# Patient Record
Sex: MALE | Race: WHITE | URBAN - NONMETROPOLITAN AREA
[De-identification: names, ages, dates, MRNs, and addresses within clinical notes are randomized per-mention and may not be internally consistent; named-entity substitution may affect disease eponyms.]

---

## 2019-04-29 ENCOUNTER — IMPORTED ENCOUNTER (OUTPATIENT)
Dept: URBAN - NONMETROPOLITAN AREA CLINIC 1 | Facility: CLINIC | Age: 64
End: 2019-04-29

## 2019-04-29 PROBLEM — H16.002: Noted: 2019-04-29

## 2019-04-29 PROCEDURE — 92002 INTRM OPH EXAM NEW PATIENT: CPT

## 2019-05-01 ENCOUNTER — IMPORTED ENCOUNTER (OUTPATIENT)
Dept: URBAN - NONMETROPOLITAN AREA CLINIC 1 | Facility: CLINIC | Age: 64
End: 2019-05-01

## 2019-05-01 PROCEDURE — 99213 OFFICE O/P EST LOW 20 MIN: CPT

## 2019-05-01 NOTE — PATIENT DISCUSSION
Corneal Ulcer OS-  discussed findings w/patient-  appears stable at this time -  Ulcer 1mm x 1.5 mm-  continue Erythromycin prachi QHS-  start Moxifloxacin Q1H OS written Rx issued-  follow up at home or prn

## 2022-04-15 ASSESSMENT — VISUAL ACUITY
OS_CC: 20/100+3
OS_PH: 20/80
OD_SC: 20/25
OS_SC: 20/200
OD_CC: 20/25+